# Patient Record
Sex: FEMALE | Race: WHITE | NOT HISPANIC OR LATINO
[De-identification: names, ages, dates, MRNs, and addresses within clinical notes are randomized per-mention and may not be internally consistent; named-entity substitution may affect disease eponyms.]

---

## 2023-01-06 ENCOUNTER — DASHBOARD ENCOUNTERS (OUTPATIENT)
Age: 73
End: 2023-01-06

## 2023-01-06 ENCOUNTER — LAB OUTSIDE AN ENCOUNTER (OUTPATIENT)
Dept: URBAN - METROPOLITAN AREA CLINIC 70 | Facility: CLINIC | Age: 73
End: 2023-01-06

## 2023-01-06 ENCOUNTER — OFFICE VISIT (OUTPATIENT)
Dept: URBAN - METROPOLITAN AREA CLINIC 70 | Facility: CLINIC | Age: 73
End: 2023-01-06
Payer: COMMERCIAL

## 2023-01-06 ENCOUNTER — WEB ENCOUNTER (OUTPATIENT)
Dept: URBAN - METROPOLITAN AREA CLINIC 70 | Facility: CLINIC | Age: 73
End: 2023-01-06

## 2023-01-06 VITALS
HEART RATE: 63 BPM | TEMPERATURE: 97.7 F | SYSTOLIC BLOOD PRESSURE: 137 MMHG | WEIGHT: 162 LBS | BODY MASS INDEX: 26.03 KG/M2 | DIASTOLIC BLOOD PRESSURE: 77 MMHG | HEIGHT: 66 IN

## 2023-01-06 DIAGNOSIS — R13.19 ESOPHAGEAL DYSPHAGIA: ICD-10-CM

## 2023-01-06 DIAGNOSIS — K21.9 GASTROESOPHAGEAL REFLUX DISEASE, UNSPECIFIED WHETHER ESOPHAGITIS PRESENT: ICD-10-CM

## 2023-01-06 DIAGNOSIS — K51.90 ULCERATIVE COLITIS WITHOUT COMPLICATIONS, UNSPECIFIED LOCATION: ICD-10-CM

## 2023-01-06 PROBLEM — 235595009: Status: ACTIVE | Noted: 2023-01-06

## 2023-01-06 PROBLEM — 64766004: Status: ACTIVE | Noted: 2023-01-06

## 2023-01-06 PROCEDURE — 99204 OFFICE O/P NEW MOD 45 MIN: CPT | Performed by: REGISTERED NURSE

## 2023-01-06 RX ORDER — FLUTICASONE PROPIONATE 50 UG/1
SPRAY, METERED NASAL
Qty: 0 | Refills: 0 | Status: DISCONTINUED | COMMUNITY
Start: 1900-01-01

## 2023-01-06 RX ORDER — RIZATRIPTAN BENZOATE 10 MG
TAKE 1 TABLET (10 MG) BY ORAL ROUTE ONCE, MAY REPEAT AT 2 HOUR INTERVALS; DO NOT EXCEED 30 MG IN 24 HOURS TABLET ORAL
Qty: 0 | Refills: 0 | Status: ACTIVE | COMMUNITY
Start: 1900-01-01

## 2023-01-06 RX ORDER — TRAMADOL HYDROCHLORIDE 50 MG/1
TAKE 1 TABLET (50 MG) BY ORAL ROUTE EVERY 6 HOURS AS NEEDED TABLET ORAL
Qty: 0 | Refills: 0 | Status: ACTIVE | COMMUNITY
Start: 1900-01-01

## 2023-01-06 RX ORDER — UPADACITINIB 15 MG/1
1 TABLET TABLET, EXTENDED RELEASE ORAL ONCE A DAY
Status: ACTIVE | COMMUNITY

## 2023-01-06 RX ORDER — FLUTICASONE FUROATE 100 UG/1
INHALE 1 PUFF (100 MCG) BY INHALATION ROUTE ONCE DAILY AT THE SAME TIME EACH DAY POWDER RESPIRATORY (INHALATION) 1
Qty: 1 | Refills: 0 | Status: ACTIVE | COMMUNITY
Start: 1900-01-01

## 2023-01-06 RX ORDER — DIVALPROEX SODIUM 250 MG/1
TAKE 1 TABLET (250 MG) BY ORAL ROUTE ONCE DAILY TABLET, DELAYED RELEASE ORAL 1
Qty: 0 | Refills: 0 | Status: ACTIVE | COMMUNITY
Start: 1900-01-01

## 2023-01-06 RX ORDER — IBUPROFEN 200 MG/1
TAKE 2 TABLETS (400 MG) BY ORAL ROUTE EVERY 6 HOURS AS NEEDED WITH FOOD TABLET, COATED ORAL
Qty: 0 | Refills: 0 | Status: ACTIVE | COMMUNITY
Start: 1900-01-01

## 2023-01-06 NOTE — HPI-TODAY'S VISIT:
Pt has a hx of UC. She has also been diagnose with HaT syndrome. She has multiple GI complaints. She reports early satiety, nausea, intermittent dysphagia, and alternating constipation and diarrhea.  Colonoscopy 3/18/16 showed mild chronic inflammation

## 2023-02-07 ENCOUNTER — TELEPHONE ENCOUNTER (OUTPATIENT)
Dept: URBAN - METROPOLITAN AREA CLINIC 70 | Facility: CLINIC | Age: 73
End: 2023-02-07

## 2023-04-10 ENCOUNTER — OFFICE VISIT (OUTPATIENT)
Dept: URBAN - METROPOLITAN AREA MEDICAL CENTER 42 | Facility: MEDICAL CENTER | Age: 73
End: 2023-04-10

## 2023-04-10 RX ORDER — FLUTICASONE FUROATE 100 UG/1
INHALE 1 PUFF (100 MCG) BY INHALATION ROUTE ONCE DAILY AT THE SAME TIME EACH DAY POWDER RESPIRATORY (INHALATION) 1
Qty: 1 | Refills: 0 | Status: ACTIVE | COMMUNITY
Start: 1900-01-01

## 2023-04-10 RX ORDER — UPADACITINIB 15 MG/1
1 TABLET TABLET, EXTENDED RELEASE ORAL ONCE A DAY
Status: ACTIVE | COMMUNITY

## 2023-04-10 RX ORDER — TRAMADOL HYDROCHLORIDE 50 MG/1
TAKE 1 TABLET (50 MG) BY ORAL ROUTE EVERY 6 HOURS AS NEEDED TABLET ORAL
Qty: 0 | Refills: 0 | Status: ACTIVE | COMMUNITY
Start: 1900-01-01

## 2023-04-10 RX ORDER — IBUPROFEN 200 MG/1
TAKE 2 TABLETS (400 MG) BY ORAL ROUTE EVERY 6 HOURS AS NEEDED WITH FOOD TABLET, COATED ORAL
Qty: 0 | Refills: 0 | Status: ACTIVE | COMMUNITY
Start: 1900-01-01

## 2023-04-10 RX ORDER — DIVALPROEX SODIUM 250 MG/1
TAKE 1 TABLET (250 MG) BY ORAL ROUTE ONCE DAILY TABLET, DELAYED RELEASE ORAL 1
Qty: 0 | Refills: 0 | Status: ACTIVE | COMMUNITY
Start: 1900-01-01

## 2023-04-10 RX ORDER — RIZATRIPTAN BENZOATE 10 MG
TAKE 1 TABLET (10 MG) BY ORAL ROUTE ONCE, MAY REPEAT AT 2 HOUR INTERVALS; DO NOT EXCEED 30 MG IN 24 HOURS TABLET ORAL
Qty: 0 | Refills: 0 | Status: ACTIVE | COMMUNITY
Start: 1900-01-01

## 2023-04-24 ENCOUNTER — TELEPHONE ENCOUNTER (OUTPATIENT)
Dept: URBAN - METROPOLITAN AREA CLINIC 70 | Facility: CLINIC | Age: 73
End: 2023-04-24

## 2023-05-15 ENCOUNTER — OFFICE VISIT (OUTPATIENT)
Dept: URBAN - METROPOLITAN AREA MEDICAL CENTER 42 | Facility: MEDICAL CENTER | Age: 73
End: 2023-05-15

## 2023-05-15 RX ORDER — DIVALPROEX SODIUM 250 MG/1
TAKE 1 TABLET (250 MG) BY ORAL ROUTE ONCE DAILY TABLET, DELAYED RELEASE ORAL 1
Qty: 0 | Refills: 0 | Status: ACTIVE | COMMUNITY
Start: 1900-01-01

## 2023-05-15 RX ORDER — IBUPROFEN 200 MG/1
TAKE 2 TABLETS (400 MG) BY ORAL ROUTE EVERY 6 HOURS AS NEEDED WITH FOOD TABLET, COATED ORAL
Qty: 0 | Refills: 0 | Status: ACTIVE | COMMUNITY
Start: 1900-01-01

## 2023-05-15 RX ORDER — UPADACITINIB 15 MG/1
1 TABLET TABLET, EXTENDED RELEASE ORAL ONCE A DAY
Status: ACTIVE | COMMUNITY

## 2023-05-15 RX ORDER — FLUTICASONE FUROATE 100 UG/1
INHALE 1 PUFF (100 MCG) BY INHALATION ROUTE ONCE DAILY AT THE SAME TIME EACH DAY POWDER RESPIRATORY (INHALATION) 1
Qty: 1 | Refills: 0 | Status: ACTIVE | COMMUNITY
Start: 1900-01-01

## 2023-05-15 RX ORDER — TRAMADOL HYDROCHLORIDE 50 MG/1
TAKE 1 TABLET (50 MG) BY ORAL ROUTE EVERY 6 HOURS AS NEEDED TABLET ORAL
Qty: 0 | Refills: 0 | Status: ACTIVE | COMMUNITY
Start: 1900-01-01

## 2023-05-15 RX ORDER — RIZATRIPTAN BENZOATE 10 MG
TAKE 1 TABLET (10 MG) BY ORAL ROUTE ONCE, MAY REPEAT AT 2 HOUR INTERVALS; DO NOT EXCEED 30 MG IN 24 HOURS TABLET ORAL
Qty: 0 | Refills: 0 | Status: ACTIVE | COMMUNITY
Start: 1900-01-01

## 2023-06-19 ENCOUNTER — OFFICE VISIT (OUTPATIENT)
Dept: URBAN - METROPOLITAN AREA MEDICAL CENTER 42 | Facility: MEDICAL CENTER | Age: 73
End: 2023-06-19